# Patient Record
(demographics unavailable — no encounter records)

---

## 2024-12-05 NOTE — HISTORY OF PRESENT ILLNESS
[FreeTextEntry1] : 61-year-old male with a past medical history of hyperlipidemia, abnormal diabetes comes to clinic to establish care.  Patient does not have any active complaints today.  He is not up-to-date with his vaccinations but would like to research before he gets the shingles and RSV vaccine.  He is up-to-date with COVID and flu shots

## 2024-12-05 NOTE — HEALTH RISK ASSESSMENT
[Yes] : Yes [4 or more  times a week (4 pts)] : 4 or more  times a week (4 points) [1 or 2 (0 pts)] : 1 or 2 (0 points) [Never (0 pts)] : Never (0 points) [No] : In the past 12 months have you used drugs other than those required for medical reasons? No [Little interest or pleasure doing things] : 1) Little interest or pleasure doing things [Feeling down, depressed, or hopeless] : 2) Feeling down, depressed, or hopeless [0] : 2) Feeling down, depressed, or hopeless: Not at all (0) [PHQ-2 Negative - No further assessment needed] : PHQ-2 Negative - No further assessment needed [Time Spent: ___ Minutes] : I spent [unfilled] minutes performing a depression screening for this patient. [Patient reported colonoscopy was abnormal] : Patient reported colonoscopy was abnormal [Alone] : lives alone [Employed] : employed [] :  [Fully functional (bathing, dressing, toileting, transferring, walking, feeding)] : Fully functional (bathing, dressing, toileting, transferring, walking, feeding) [Fully functional (using the telephone, shopping, preparing meals, housekeeping, doing laundry, using] : Fully functional and needs no help or supervision to perform IADLs (using the telephone, shopping, preparing meals, housekeeping, doing laundry, using transportation, managing medications and managing finances) [Former] : Former [15-19] : 15-19 [> 15 Years] : > 15 Years [de-identified] : active [[id number]] : 0 [Sexually Active] : not sexually active [Reports changes in hearing] : Reports no changes in hearing [Reports changes in vision] : Reports no changes in vision [ColonoscopyDate] : 4/2024 [ColonoscopyComments] : repeat in 3 yrs

## 2025-01-21 NOTE — HISTORY OF PRESENT ILLNESS
[FreeTextEntry1] : 61 years old male was referred for evaluation for his elevated liver enzymes and hepatic steatosis history of ETOH intake 25 drinks per week Tobacco use=none IV drug use=none No other liver history No history of hepatitis No history of Jaundice, no hospitalizations Appendectomy 2012 patient has family history of Diabetes from his father's side  medications: --Atorvastatin 10mg once  a day -- Ezetimibe 10mg once a day  Labs: 12/09/2024 AST 61 ALT 97 ALk phos 69   US abdominal 12/11/2024= Liver: Mildly enlarged. Diffuse increased hepatic parenchymal echogenicity consistent with hepatic steatosis. No focal abnormality. Patent hepatic vasculature  Fibrsocan today 15.7kPa 311CAP

## 2025-01-21 NOTE — ASSESSMENT
[FreeTextEntry1] : 61 tear old male has had history of alcohol use disorder up to 25 drinks or equivalent per week Recently attempted abstinence for past two weeks Has Fibroscan evidence of early cirrhosis Situation fully discussed with patient CAT scan and additional labs ordered Patient will start Naltrexone RX to help with AUD return in 3 weeks

## 2025-01-21 NOTE — PHYSICAL EXAM
[Scleral Icterus] : No Scleral Icterus [Hepatojugular Reflux] : patient did not have a sustained hepatojugular reflux [Spider Angioma] : No spider angioma(s) were observed [Abdominal Bruit] : no abdominal bruit [Ascites Fluid Wave] : no ascites fluid wave [Ascites Tense] : ascites is not tense [Ascites Shifting Dullness] : no shifting dullness of ascites [Splenomegaly] : no splenomegaly [Caput Medusae] : no caput medusae observed [Liver Palpable ___ Finger Breadths Below Costal Margin] : Liver edge was palpable [unfilled] finger breadths below costal margin [Smooth] : smooth [Asterixis] : no asterixis observed [Jaundice] : No jaundice [Depression] : no depression [Hallucinations] : ~T no ~M hallucinations [General Appearance - Alert] : alert [General Appearance - In No Acute Distress] : in no acute distress [Sclera] : the sclera and conjunctiva were normal [Outer Ear] : the ears and nose were normal in appearance [Hearing Threshold Finger Rub Not Dyer] : hearing was normal [Neck Appearance] : the appearance of the neck was normal [Respiration, Rhythm And Depth] : normal respiratory rhythm and effort [Apical Impulse] : the apical impulse was normal [Heart Rate And Rhythm] : heart rate was normal and rhythm regular [Bowel Sounds] : normal bowel sounds [Abdomen Soft] : soft [Abdomen Tenderness] : non-tender [Abdomen Mass (___ Cm)] : no abdominal mass palpated [No CVA Tenderness] : no ~M costovertebral angle tenderness [No Spinal Tenderness] : no spinal tenderness [Abnormal Walk] : normal gait [Nail Clubbing] : no clubbing  or cyanosis of the fingernails [Musculoskeletal - Swelling] : no joint swelling seen [Motor Tone] : muscle strength and tone were normal [Skin Color & Pigmentation] : normal skin color and pigmentation [Skin Turgor] : normal skin turgor [] : no rash [Deep Tendon Reflexes (DTR)] : deep tendon reflexes were 2+ and symmetric [Sensation] : the sensory exam was normal to light touch and pinprick [No Focal Deficits] : no focal deficits [Oriented To Time, Place, And Person] : oriented to person, place, and time [Impaired Insight] : insight and judgment were intact [Affect] : the affect was normal

## 2025-02-18 NOTE — HISTORY OF PRESENT ILLNESS
[FreeTextEntry1] : 61 years old male was referred for evaluation for his elevated liver enzymes and hepatic steatosis H/O significant steatdy alc usage in past for the past 1 month patient did not have any ETOH  patient lost 15lb   medications: --Atorvastatin 10mg once a day -- Ezetimibe 10mg once a day  Labs 01/21/2025 AST (SGOT)	28 U/L	 	 ALT(SGPT)	53 U/L	  ALK PHOS	72 U/L	 	   US abdominal 12/11/2024= Liver: Mildly enlarged. Diffuse increased hepatic parenchymal echogenicity consistent with hepatic steatosis. No focal abnormality. Patent hepatic vasculature MRI liver 02/10/2025= LIVER: Mild hepatic steatosis. The liver surface contour is grossly smooth. No discrete hepatic lesion identified. CT scan 02/10/2025= LIVER: Probable mild hepatic steatosis. There appears to be mild surface contour nodularity of the liver, better appreciated on the comparison . No suspicious hepatic mass identified.  during last visit--Fibroscan= 15.7kPa 311CAP Fibroscan today 16.4kPa 299CAP

## 2025-02-18 NOTE — PHYSICAL EXAM
[Cognitive Mini-Mental Status Normal?] : Cognitive Mini Mental Status Exam is normal [General Appearance - Alert] : alert [General Appearance - In No Acute Distress] : in no acute distress [Sclera] : the sclera and conjunctiva were normal [PERRL With Normal Accommodation] : pupils were equal in size, round, and reactive to light [Extraocular Movements] : extraocular movements were intact [Outer Ear] : the ears and nose were normal in appearance [Oropharynx] : the oropharynx was normal [Neck Appearance] : the appearance of the neck was normal [Neck Cervical Mass (___cm)] : no neck mass was observed [Jugular Venous Distention Increased] : there was no jugular-venous distention [Thyroid Diffuse Enlargement] : the thyroid was not enlarged [Thyroid Nodule] : there were no palpable thyroid nodules [Auscultation Breath Sounds / Voice Sounds] : lungs were clear to auscultation bilaterally [Heart Rate And Rhythm] : heart rate was normal and rhythm regular [Heart Sounds] : normal S1 and S2 [Heart Sounds Gallop] : no gallops [Murmurs] : no murmurs [Heart Sounds Pericardial Friction Rub] : no pericardial rub [Bowel Sounds] : normal bowel sounds [Abdomen Soft] : soft [Abdomen Tenderness] : non-tender [Abdomen Mass (___ Cm)] : no abdominal mass palpated [Abnormal Walk] : normal gait [Nail Clubbing] : no clubbing  or cyanosis of the fingernails [Musculoskeletal - Swelling] : no joint swelling seen [Motor Tone] : muscle strength and tone were normal [Skin Color & Pigmentation] : normal skin color and pigmentation [Skin Turgor] : normal skin turgor [] : no rash [Oriented To Time, Place, And Person] : oriented to person, place, and time [Affect] : the affect was normal [Impaired Insight] : insight and judgment were intact [Scleral Icterus] : No Scleral Icterus [Hepatojugular Reflux] : patient did not have a sustained hepatojugular reflux [Spider Angioma] : No spider angioma(s) were observed [Abdominal Bruit] : no abdominal bruit [Abdominal  Ascites] : no ascites [Ascites Fluid Wave] : no ascites fluid wave [Ascites Tense] : ascites is not tense [Ascites Shifting Dullness] : no shifting dullness of ascites [Splenomegaly] : no splenomegaly [Caput Medusae] : no caput medusae observed [Liver Palpable ___ Finger Breadths Below Costal Margin] : Liver edge was palpable [unfilled] finger breadths below costal margin [Scrotal Edema] : Scrotum is not edematous [Asterixis] : no asterixis observed [Jaundice] : No jaundice [Palmar Erythema] : no Palmar Erythema [Depression] : no depression [Hallucinations] : ~T no ~M hallucinations [Delusions] : no ~T delusions [Suicidal Ideation] : no suicidal ideation [Homicidal Ideation] : no homicidal ideations [Preoccupiation With Violent Thoughts] : no preoccupation with violent thoughts

## 2025-02-18 NOTE — ASSESSMENT
[FreeTextEntry1] : 61 year old male h/o significant alcohol intake patient has been abstinenet x one month 15 lb wt loss and iprovment in LFTs Imaging +/- re dx of cirrhosis Fibroscan +/+ cirrhosis  F4 patient declined Naltrexone says he has managed abstinence on his own plan: continued abstinence, CHO nutritional control repeat labs and visit in six weeks

## 2025-06-03 NOTE — HISTORY OF PRESENT ILLNESS
[FreeTextEntry1] :  61-year-old male with past medical history of hepatic steatosis, hyperlipidemia, oral herpes comes to clinic for follow-up.  He does not have any active complaints today.  He would like to get the pneumonia shot, he is up-to-date with rest of his medications.

## 2025-07-22 NOTE — ASSESSMENT
[FreeTextEntry1] : 61 years old male originally  referred for evaluation of elevated liver enzymes and hepatic steatosis H/O significant alcohol  usage in past---now abstinent for the past 3 months accompanied by fifteen pound weight loss. Labs 07/14/2025: AST (SGOT) 28 U/L, ALT(SGPT) 53 U/L, ALK PHOS 72 U/L, GGT 30; AFP 2.4; A1C 5.4 Imaging: US abdominal 12/11/2024= Hepatomegaly/steatosis MRI liver 02/10/2025= LIVER: Mild hepatic steatosis. Liver surface contour is grossly smooth. d. Original Fibroscan= 15.7kPa 311CAP  vs. FIbroscan Today 07/22/2025 8.0kPa 203CAP (Improved significantly) Plan: continued abstinence and nutritional control. RTV 3 months

## 2025-07-22 NOTE — HISTORY OF PRESENT ILLNESS
[FreeTextEntry1] : 61 years old male originally  referred for evaluation of elevated liver enzymes and hepatic steatosis H/O significant alcohol  usage in past---now abstinent for the past 3 months accompanied by fifteen pound weight loss. Weight:  stable currently at 207lb with BMI of 27 Medications: --Atorvastatin 10mg once a day -- Ezetimibe 10mg once a day Labs 07/14/2025: AST (SGOT) 28 U/L     ALT(SGPT) 53 U/L   ALK PHOS 72 U/L     GGT 30; AFP 2.4; A1C 5.4 Imaging: US abdominal 12/11/2024= Liver: Mildly enlarged. Diffuse increased hepatic parenchymal echogenicity consistent with hepatic steatosis. No focal abnormality. Patent hepatic vasculature MRI liver 02/10/2025= LIVER: Mild hepatic steatosis. The liver surface contour is grossly smooth. No discrete hepatic lesion identified. CT scan 02/10/2025= LIVER: Probable mild hepatic steatosis. There appears to be mild surface contour nodularity of the liver, but better appreciated on the comparison   MRI. No suspicious hepatic mass identified.  Previous Fibroscan= 15.7kPa 311CAP Fibroscan during last visit 16.4kPa 299CAP,  follow up visit:  10.4kPa 235CAP  FIbroscan Today 07/22/2025 8.0kPa 203CAP (Improved significantly)

## 2025-07-22 NOTE — PHYSICAL EXAM
[Non-Tender] : non-tender [General Appearance - Alert] : alert [General Appearance - In No Acute Distress] : in no acute distress [Sclera] : the sclera and conjunctiva were normal [PERRL With Normal Accommodation] : pupils were equal in size, round, and reactive to light [Extraocular Movements] : extraocular movements were intact [Neck Appearance] : the appearance of the neck was normal [Neck Cervical Mass (___cm)] : no neck mass was observed [Jugular Venous Distention Increased] : there was no jugular-venous distention [Thyroid Diffuse Enlargement] : the thyroid was not enlarged [Thyroid Nodule] : there were no palpable thyroid nodules [Auscultation Breath Sounds / Voice Sounds] : lungs were clear to auscultation bilaterally [Heart Rate And Rhythm] : heart rate was normal and rhythm regular [Heart Sounds] : normal S1 and S2 [Heart Sounds Gallop] : no gallops [Murmurs] : no murmurs [Heart Sounds Pericardial Friction Rub] : no pericardial rub [Bowel Sounds] : normal bowel sounds [Abdomen Soft] : soft [Abdomen Tenderness] : non-tender [Abdomen Mass (___ Cm)] : no abdominal mass palpated [No CVA Tenderness] : no ~M costovertebral angle tenderness [No Spinal Tenderness] : no spinal tenderness [Abnormal Walk] : normal gait [Nail Clubbing] : no clubbing  or cyanosis of the fingernails [Musculoskeletal - Swelling] : no joint swelling seen [Motor Tone] : muscle strength and tone were normal [Skin Color & Pigmentation] : normal skin color and pigmentation [Skin Turgor] : normal skin turgor [] : no rash [Scleral Icterus] : No Scleral Icterus [Hepatojugular Reflux] : patient did not have a sustained hepatojugular reflux [Spider Angioma] : No spider angioma(s) were observed [Abdominal Bruit] : no abdominal bruit [Abdominal  Ascites] : no ascites [Ascites Fluid Wave] : no ascites fluid wave [Ascites Tense] : ascites is not tense [Ascites Shifting Dullness] : no shifting dullness of ascites [Splenomegaly] : no splenomegaly [Caput Medusae] : no caput medusae observed [Liver Palpable ___ Finger Breadths Below Costal Margin] : was not palpable below costal margin [Asterixis] : no asterixis observed [Jaundice] : No jaundice [Palmar Erythema] : no Palmar Erythema [Depression] : no depression [Hallucinations] : ~T no ~M hallucinations [Delusions] : no ~T delusions [Suicidal Ideation] : no suicidal ideation [Homicidal Ideation] : no homicidal ideations [Preoccupiation With Violent Thoughts] : no preoccupation with violent thoughts [Deep Tendon Reflexes (DTR)] : deep tendon reflexes were 2+ and symmetric [Sensation] : the sensory exam was normal to light touch and pinprick [No Focal Deficits] : no focal deficits [Oriented To Time, Place, And Person] : oriented to person, place, and time [Impaired Insight] : insight and judgment were intact [Affect] : the affect was normal